# Patient Record
Sex: MALE | Race: WHITE | NOT HISPANIC OR LATINO | Employment: FULL TIME | URBAN - METROPOLITAN AREA
[De-identification: names, ages, dates, MRNs, and addresses within clinical notes are randomized per-mention and may not be internally consistent; named-entity substitution may affect disease eponyms.]

---

## 2017-04-30 ENCOUNTER — OFFICE VISIT (OUTPATIENT)
Dept: URGENT CARE | Facility: CLINIC | Age: 27
End: 2017-04-30

## 2017-04-30 VITALS
DIASTOLIC BLOOD PRESSURE: 84 MMHG | OXYGEN SATURATION: 96 % | TEMPERATURE: 98.6 F | SYSTOLIC BLOOD PRESSURE: 146 MMHG | HEIGHT: 74 IN | WEIGHT: 179 LBS | BODY MASS INDEX: 22.97 KG/M2 | HEART RATE: 104 BPM

## 2017-04-30 DIAGNOSIS — L03.213 PRESEPTAL CELLULITIS OF RIGHT LOWER EYELID: ICD-10-CM

## 2017-04-30 PROCEDURE — 99203 OFFICE O/P NEW LOW 30 MIN: CPT | Performed by: PHYSICIAN ASSISTANT

## 2017-04-30 RX ORDER — CLINDAMYCIN HYDROCHLORIDE 300 MG/1
300 CAPSULE ORAL 3 TIMES DAILY
Qty: 21 CAP | Refills: 0 | Status: SHIPPED | OUTPATIENT
Start: 2017-04-30 | End: 2017-05-07

## 2017-04-30 RX ORDER — POLYMYXIN B SULFATE AND TRIMETHOPRIM 1; 10000 MG/ML; [USP'U]/ML
1 SOLUTION OPHTHALMIC EVERY 4 HOURS
Qty: 10 ML | Refills: 0 | Status: SHIPPED | OUTPATIENT
Start: 2017-04-30 | End: 2017-05-05

## 2017-04-30 ASSESSMENT — ENCOUNTER SYMPTOMS
CHILLS: 0
HEADACHES: 0
MUSCULOSKELETAL NEGATIVE: 1
FEVER: 0
BLURRED VISION: 0
DIZZINESS: 0
EYE DISCHARGE: 0
PHOTOPHOBIA: 0
DIARRHEA: 0
FOREIGN BODY SENSATION: 0
SHORTNESS OF BREATH: 0
NAUSEA: 0
DOUBLE VISION: 0
ABDOMINAL PAIN: 0
VOMITING: 0
EYE REDNESS: 1
EYE PAIN: 0

## 2017-04-30 NOTE — MR AVS SNAPSHOT
"        Delbert Barney   2017 4:00 PM   Office Visit   MRN: [id number]    Department:  [facility]   Dept Phone:  [phone]    Description:  Male : 1990   Provider:  Spring Espinal PA-C           Reason for Visit     Eye Problem got some water while camping into eye and now red, swollen      Allergies as of 2017     Allergen Noted Reactions    Ibuprofen 2016       Shellfish Allergy 2016         You were diagnosed with     Preseptal cellulitis of right lower eyelid   [[id number]]         Vital Signs     Blood Pressure Pulse Temperature Height Weight Body Mass Index    146/84 mmHg 104 37 °C (98.6 °F) 1.88 m (6' 2\") 81.194 kg (179 lb) 22.97 kg/m2    Oxygen Saturation Smoking Status                96% Current Every Day Smoker          Basic Information     Date Of Birth Sex Race Ethnicity Preferred Language    1990 Male White Non- English      Health Maintenance     Patient has no pending health maintenance at this time      Current Immunizations     No immunizations on file.      Below and/or attached are the medications your provider expects you to take. Review all of your home medications and newly ordered medications with your provider and/or pharmacist. Follow medication instructions as directed by your provider and/or pharmacist. Please keep your medication list with you and share with your provider. Update the information when medications are discontinued, doses are changed, or new medications (including over-the-counter products) are added; and carry medication information at all times in the event of emergency situations     Allergies:  IBUPROFEN - (reactions not documented)     SHELLFISH ALLERGY - (reactions not documented)               Medications  Valid as of: 2017 -  5:31 PM    Generic Name Brand Name Tablet Size Instructions for use    Clindamycin HCl (Cap) CLEOCIN 300 MG Take 1 Cap by mouth 3 times a day for 7 days.        Docusate Sodium " (Cap) COLACE 100 MG Take 100 mg by mouth 2 times a day.        Oxycodone-Acetaminophen (Tab) PERCOCET 5-325 MG Take 1-2 Tabs by mouth every four hours as needed.        Polyethylene Glycol 3350 (Pack) MIRALAX  Take 17 g by mouth every day.        Polymyxin B-Trimethoprim (Solution) POLYTRIM 75629-2.1 UNIT/ML-% Place 1 Drop in both eyes every 4 hours.        .                 Medicines prescribed today were sent to:     [id number] - [place] - [address]    [address] [place] [address]    Phone: [phone] Fax: [phone]    Open 24 Hours?: No      Medication refill instructions:       If your prescription bottle indicates you have medication refills left, it is not necessary to call your provider’s office. Please contact your pharmacy and they will refill your medication.    If your prescription bottle indicates you do not have any refills left, you may request refills at any time through one of the following ways: The online Bluwan system (except Urgent Care), by calling your provider’s office, or by asking your pharmacy to contact your provider’s office with a refill request. Medication refills are processed only during regular business hours and may not be available until the next business day. Your provider may request additional information or to have a follow-up visit with you prior to refilling your medication.   *Please Note: Medication refills are assigned a new Rx number when refilled electronically. Your pharmacy may indicate that no refills were authorized even though a new prescription for the same medication is available at the pharmacy. Please request the medicine by name with the pharmacy before contacting your provider for a refill.           Bluwan Access Code: [email or web address]  Expires: 5/30/2017  5:31 PM    Bluwan  A secure, online tool to manage your health information     [employer]’s Bluwan® is a secure, online tool that connects you to your personalized  health information from the privacy of your home -- day or night - making it very easy for you to manage your healthcare. Once the activation process is completed, you can even access your medical information using the 8thBridge elsa, which is available for free in the Apple Elsa store or Google Play store.     8thBridge provides the following levels of access (as shown below):   My Chart Features   [facility] Doctor [facility]  Specialists [facility]  Urgent  Care Non-[facility]  Primary Care  Doctor   Email your healthcare team securely and privately 24/7 X X X    Manage appointments: schedule your next appointment; view details of past/upcoming appointments X      Request prescription refills. X      View recent personal medical records, including lab and immunizations X X X X   View health record, including health history, allergies, medications X X X X   Read reports about your outpatient visits, procedures, consult and ER notes X X X X   See your discharge summary, which is a recap of your hospital and/or ER visit that includes your diagnosis, lab results, and care plan. X X       How to register for 8thBridge:  1. Go to  [email or web address].  2. Click on the Sign Up Now box, which takes you to the New Member Sign Up page. You will need to provide the following information:  a. Enter your 8thBridge Access Code exactly as it appears at the top of this page. (You will not need to use this code after you’ve completed the sign-up process. If you do not sign up before the expiration date, you must request a new code.)   b. Enter your date of birth.   c. Enter your home email address.   d. Click Submit, and follow the next screen’s instructions.  3. Create a 8thBridge ID. This will be your 8thBridge login ID and cannot be changed, so think of one that is secure and easy to remember.  4. Create a 8thBridge password. You can change your password at any time.  5. Enter your Password Reset Question and Answer. This can be used at a  later time if you forget your password.   6. Enter your e-mail address. This allows you to receive e-mail notifications when new information is available in Brandkids.  7. Click Sign Up. You can now view your health information.    For assistance activating your Brandkids account, call [phone]        Quit Tobacco Information     Do you want to quit using tobacco?    Quitting tobacco decreases risks of cancer, heart and lung disease, increases life expectancy, improves sense of taste and smell, and increases spending money, among other benefits.    If you are thinking about quitting, we can help.  • [facility] Quit Tobacco Program: 105.391.2630  o Program occurs weekly for four weeks and includes pharmacist consultation on products to support quitting smoking or chewing tobacco. A provider referral is needed for pharmacist consultation.  • Tobacco Users Help Hotline: 4-949-QUIT-NOW (408-2004) or https://[place].quitlogix.org/  o Free, confidential telephone and online coaching for [place] residents. Sessions are designed on a schedule that is convenient for you. Eligible clients receive free nicotine replacement therapy.  • Nationally: www.smokefree.gov  o Information and professional assistance to support both immediate and long-term needs as you become, and remain, a non-smoker. Smokefree.gov allows you to choose the help that best fits your needs.

## 2017-05-01 NOTE — PROGRESS NOTES
"Subjective:      Delbert Braney is a 27 y.o. male who presents with Eye Problem            Eye Problem   The right eye is affected. This is a new problem. The current episode started in the past 7 days (2-3 days). The problem occurs constantly. The problem has been gradually worsening. Injury mechanism: diry water. The pain is at a severity of 0/10. The patient is experiencing no pain. There is no known exposure to pink eye. He does not wear contacts. Associated symptoms include eye redness. Pertinent negatives include no blurred vision, eye discharge, double vision, fever, foreign body sensation, nausea, photophobia or vomiting. He has tried eye drops for the symptoms. The treatment provided no relief.     Patient reports right eye redness with slight clear discharge for the past 2-3 days. He states he was camping when some \"dirty pond water\" got splashed in his right eye. He reports worsening right eye redness since then. Denies any eye pain, change in vision, photophobia, or foreign body sensation.     Review of Systems   Constitutional: Negative for fever and chills.   Eyes: Positive for redness. Negative for blurred vision, double vision, photophobia, pain and discharge.   Respiratory: Negative for shortness of breath.    Cardiovascular: Negative for chest pain.   Gastrointestinal: Negative for nausea, vomiting, abdominal pain and diarrhea.   Genitourinary: Negative.    Musculoskeletal: Negative.    Neurological: Negative for dizziness and headaches.          Objective:     /84 mmHg  Pulse 104  Temp(Src) 37 °C (98.6 °F)  Ht 1.88 m (6' 2\")  Wt 81.194 kg (179 lb)  BMI 22.97 kg/m2  SpO2 96%     Physical Exam   Constitutional: He is oriented to person, place, and time. He appears well-developed and well-nourished. No distress.   HENT:   Head: Normocephalic and atraumatic.   Eyes: EOM are normal. Pupils are equal, round, and reactive to light. Right conjunctiva is injected. Right conjunctiva has no " hemorrhage. Left conjunctiva is not injected. Left conjunctiva has no hemorrhage.   No pain with EOM. Conjunctiva erythematous without discharge noted. Mild erythema and edema noted over right lower lid and right upper lid.    Neck: Normal range of motion.   Cardiovascular: Normal rate.    Pulmonary/Chest: Effort normal.   Musculoskeletal: Normal range of motion.   Neurological: He is alert and oriented to person, place, and time.   Skin: He is not diaphoretic.   Psychiatric: He has a normal mood and affect. His behavior is normal.   Nursing note and vitals reviewed.         PMH:  has a past medical history of Asthma.  MEDS:   Current outpatient prescriptions:   •  clindamycin (CLEOCIN) 300 MG Cap, Take 1 Cap by mouth 3 times a day for 7 days., Disp: 21 Cap, Rfl: 0  •  polymixin-trimethoprim (POLYTRIM) 46554-1.1 UNIT/ML-% Solution, Place 1 Drop in both eyes every 4 hours., Disp: 10 mL, Rfl: 0  •  polyethylene glycol/lytes (MIRALAX) Pack, Take 17 g by mouth every day., Disp: , Rfl:   •  docusate sodium (COLACE) 100 MG Cap, Take 100 mg by mouth 2 times a day., Disp: , Rfl:   •  oxycodone-acetaminophen (PERCOCET) 5-325 MG Tab, Take 1-2 Tabs by mouth every four hours as needed., Disp: , Rfl:   ALLERGIES:   Allergies   Allergen Reactions   • Ibuprofen    • Shellfish Allergy      SURGHX:   Past Surgical History   Procedure Laterality Date   • Other abdominal surgery       hemorhoids     SOCHX:  reports that he has been smoking.  He does not have any smokeless tobacco history on file. He reports that he drinks alcohol. He reports that he does not use illicit drugs.  FH: family history is not on file.       Assessment/Plan:     1. Preseptal cellulitis of right lower eyelid  - clindamycin (CLEOCIN) 300 MG Cap; Take 1 Cap by mouth 3 times a day for 7 days.  Dispense: 21 Cap; Refill: 0   - Complete full course of antibiotics as prescribed   - polymixin-trimethoprim (POLYTRIM) 94496-7.1 UNIT/ML-% Solution; Place 1 Drop in both  eyes every 4 hours.  Dispense: 10 mL; Refill: 0    STRICT instructions given to go to the ED for any worsening symptoms after initiation of antibiotic therapy or development of fever or eye pain. The patient demonstrated a good understanding and agreed with the treatment plan..

## 2017-05-02 ENCOUNTER — TELEPHONE (OUTPATIENT)
Dept: URGENT CARE | Facility: CLINIC | Age: 27
End: 2017-05-02

## 2017-05-02 ENCOUNTER — TELEPHONE (OUTPATIENT)
Dept: URGENT CARE | Facility: PHYSICIAN GROUP | Age: 27
End: 2017-05-02

## 2017-05-02 NOTE — TELEPHONE ENCOUNTER
Called patient back regarding his eye symptoms. He states the welling around his eye has gone down since starting Clindamycin over the past 2 days, but his conjunctiva has become more red. His mother told him to go to the ER but he doesn't have good insurance and wanted to make sure he actually needed to go. He denies any change in vision, eye pain, or pain with EOMs. Denies fevers or chills. At this time I do not believe he needs to be seen in the ER. Advised to continue antibiotic eye drops and oral clindamycin as prescribed until completed. ER precautions given should he develop any increased swelling around the eye, eye pain with EOMs, or fevers. The patient demonstrated a good understanding and agreed with the treatment plan.

## 2017-05-02 NOTE — TELEPHONE ENCOUNTER
Called today to speak with you about whether or not he needs to be seen at ER. The swelling is going down but the eye is getting better. [phone]  Cell number

## 2017-05-04 ENCOUNTER — TELEPHONE (OUTPATIENT)
Dept: URGENT CARE | Facility: CLINIC | Age: 27
End: 2017-05-04

## 2017-05-05 ENCOUNTER — TELEPHONE (OUTPATIENT)
Dept: URGENT CARE | Facility: CLINIC | Age: 27
End: 2017-05-05

## 2017-05-05 DIAGNOSIS — H10.31 ACUTE CONJUNCTIVITIS OF RIGHT EYE, UNSPECIFIED ACUTE CONJUNCTIVITIS TYPE: ICD-10-CM

## 2017-05-05 RX ORDER — POLYMYXIN B SULFATE AND TRIMETHOPRIM 1; 10000 MG/ML; [USP'U]/ML
1 SOLUTION OPHTHALMIC EVERY 4 HOURS
Qty: 10 ML | Refills: 0 | Status: SHIPPED | OUTPATIENT
Start: 2017-05-05 | End: 2019-08-12

## 2017-05-05 NOTE — TELEPHONE ENCOUNTER
Patient called stating his dog bit chewed on his eye drops and is requesting a refill. He states his eye symptoms are still improving but not completely resolved. Advised to continue oral antibiotic until finished and and use eye drops for 2 days after symptoms resolve. Refill of polytrim eye drops sent to patient's pharmacy.

## 2019-08-12 ENCOUNTER — APPOINTMENT (OUTPATIENT)
Dept: RADIOLOGY | Facility: MEDICAL CENTER | Age: 29
End: 2019-08-12
Attending: EMERGENCY MEDICINE
Payer: COMMERCIAL

## 2019-08-12 ENCOUNTER — HOSPITAL ENCOUNTER (EMERGENCY)
Facility: MEDICAL CENTER | Age: 29
End: 2019-08-12
Attending: EMERGENCY MEDICINE
Payer: COMMERCIAL

## 2019-08-12 VITALS
WEIGHT: 182.32 LBS | RESPIRATION RATE: 11 BRPM | BODY MASS INDEX: 23.4 KG/M2 | HEIGHT: 74 IN | OXYGEN SATURATION: 99 % | DIASTOLIC BLOOD PRESSURE: 62 MMHG | SYSTOLIC BLOOD PRESSURE: 127 MMHG | HEART RATE: 44 BPM

## 2019-08-12 DIAGNOSIS — R00.2 PALPITATIONS: ICD-10-CM

## 2019-08-12 DIAGNOSIS — R00.2 PALPITATIONS: Primary | ICD-10-CM

## 2019-08-12 DIAGNOSIS — R00.2 PALPITATION: Primary | ICD-10-CM

## 2019-08-12 LAB
ALBUMIN SERPL BCP-MCNC: 4.5 G/DL (ref 3.2–4.9)
ALBUMIN/GLOB SERPL: 1.9 G/DL
ALP SERPL-CCNC: 50 U/L (ref 30–99)
ALT SERPL-CCNC: 19 U/L (ref 2–50)
ANION GAP SERPL CALC-SCNC: 8 MMOL/L (ref 0–11.9)
AST SERPL-CCNC: 20 U/L (ref 12–45)
BASOPHILS # BLD AUTO: 1.1 % (ref 0–1.8)
BASOPHILS # BLD: 0.06 K/UL (ref 0–0.12)
BILIRUB SERPL-MCNC: 1.3 MG/DL (ref 0.1–1.5)
BUN SERPL-MCNC: 13 MG/DL (ref 8–22)
CALCIUM SERPL-MCNC: 9.4 MG/DL (ref 8.4–10.2)
CHLORIDE SERPL-SCNC: 103 MMOL/L (ref 96–112)
CO2 SERPL-SCNC: 26 MMOL/L (ref 20–33)
CREAT SERPL-MCNC: 1.06 MG/DL (ref 0.5–1.4)
D DIMER PPP IA.FEU-MCNC: <0.4 UG/ML (FEU) (ref 0–0.5)
EKG IMPRESSION: NORMAL
EOSINOPHIL # BLD AUTO: 0.19 K/UL (ref 0–0.51)
EOSINOPHIL NFR BLD: 3.4 % (ref 0–6.9)
ERYTHROCYTE [DISTWIDTH] IN BLOOD BY AUTOMATED COUNT: 38.1 FL (ref 35.9–50)
GLOBULIN SER CALC-MCNC: 2.4 G/DL (ref 1.9–3.5)
GLUCOSE SERPL-MCNC: 96 MG/DL (ref 65–99)
HCT VFR BLD AUTO: 46.2 % (ref 42–52)
HGB BLD-MCNC: 16 G/DL (ref 14–18)
IMM GRANULOCYTES # BLD AUTO: 0.01 K/UL (ref 0–0.11)
IMM GRANULOCYTES NFR BLD AUTO: 0.2 % (ref 0–0.9)
LYMPHOCYTES # BLD AUTO: 1.18 K/UL (ref 1–4.8)
LYMPHOCYTES NFR BLD: 21 % (ref 22–41)
MCH RBC QN AUTO: 30.3 PG (ref 27–33)
MCHC RBC AUTO-ENTMCNC: 34.6 G/DL (ref 33.7–35.3)
MCV RBC AUTO: 87.5 FL (ref 81.4–97.8)
MONOCYTES # BLD AUTO: 0.5 K/UL (ref 0–0.85)
MONOCYTES NFR BLD AUTO: 8.9 % (ref 0–13.4)
NEUTROPHILS # BLD AUTO: 3.67 K/UL (ref 1.82–7.42)
NEUTROPHILS NFR BLD: 65.4 % (ref 44–72)
NRBC # BLD AUTO: 0 K/UL
NRBC BLD-RTO: 0 /100 WBC
PLATELET # BLD AUTO: 210 K/UL (ref 164–446)
PMV BLD AUTO: 9.7 FL (ref 9–12.9)
POTASSIUM SERPL-SCNC: 3.8 MMOL/L (ref 3.6–5.5)
PROT SERPL-MCNC: 6.9 G/DL (ref 6–8.2)
RBC # BLD AUTO: 5.28 M/UL (ref 4.7–6.1)
SODIUM SERPL-SCNC: 137 MMOL/L (ref 135–145)
T4 FREE SERPL-MCNC: 0.87 NG/DL (ref 0.58–1.64)
TROPONIN T SERPL-MCNC: <6 NG/L (ref 6–19)
TSH SERPL DL<=0.005 MIU/L-ACNC: 0.53 UIU/ML (ref 0.38–5.33)
WBC # BLD AUTO: 5.6 K/UL (ref 4.8–10.8)

## 2019-08-12 PROCEDURE — 84484 ASSAY OF TROPONIN QUANT: CPT

## 2019-08-12 PROCEDURE — 36415 COLL VENOUS BLD VENIPUNCTURE: CPT

## 2019-08-12 PROCEDURE — 85379 FIBRIN DEGRADATION QUANT: CPT

## 2019-08-12 PROCEDURE — 84439 ASSAY OF FREE THYROXINE: CPT

## 2019-08-12 PROCEDURE — 84443 ASSAY THYROID STIM HORMONE: CPT

## 2019-08-12 PROCEDURE — 85025 COMPLETE CBC W/AUTO DIFF WBC: CPT

## 2019-08-12 PROCEDURE — 71045 X-RAY EXAM CHEST 1 VIEW: CPT

## 2019-08-12 PROCEDURE — 93005 ELECTROCARDIOGRAM TRACING: CPT | Performed by: EMERGENCY MEDICINE

## 2019-08-12 PROCEDURE — 80053 COMPREHEN METABOLIC PANEL: CPT

## 2019-08-12 PROCEDURE — 93005 ELECTROCARDIOGRAM TRACING: CPT

## 2019-08-12 PROCEDURE — 99284 EMERGENCY DEPT VISIT MOD MDM: CPT

## 2019-08-12 NOTE — ED PROVIDER NOTES
ED Provider Note    CHIEF COMPLAINT  Chief Complaint   Patient presents with   • Palpitations       HPI  Delbert Barney is a 29 y.o. male who presents planing of intermittent palpitations for the last few weeks.  The patient states that when he felt his pulse in his neck it felt rapid and irregular.  His symptoms lasted for approximately an hour with some associated shortness of breath and then resolved on its own.  The patient has a history of asthma but has not had any coughing wheezing or worsening shortness of breath lately.  He has not had any fevers or chills.  He denies any leg swelling or diaphoresis.  When he has the episodes of palpitations he states he does feel short of breath.  Patient does smoke but quit smoking 4 days ago thinking that that would help his symptoms.  He had another episode of palpitations this morning however so he came in for evaluation.  He does not take any medications.  He does smoke marijuana and stop smoking cigarettes 2 years ago but otherwise has a 9-year history of smoking.  He does not take any medications on a daily basis.  He denies any other drug or stimulant use other than a couple coffee in the morning.  Currently he does not have any palpitations and is chest pain-free.    REVIEW OF SYSTEMS  HEENT:  No ear pain, congestion or sore throat   EYES: no discharge redness or vision changes  CARDIAC: no chest pain, positive palpitations palpitations    PULMONARY: no dyspnea, cough or congestion   GI: no vomiting diarrhea or abdominal pain   : no dysuria, back pain or hematuria   Neuro: no weakness, numbness aphasia or headache  Musculoskeletal: no swelling deformity or pain no joint swelling  Endocrine: no fevers, sweating, weight loss   SKIN: no rash, erythema or contusions     See history of present illness all other systems are negative  .     PAST MEDICAL HISTORY  Past Medical History:   Diagnosis Date   • Asthma        FAMILY HISTORY  No family history on  "file.    SOCIAL HISTORY  Social History     Socioeconomic History   • Marital status: Single     Spouse name: Not on file   • Number of children: Not on file   • Years of education: Not on file   • Highest education level: Not on file   Occupational History   • Not on file   Social Needs   • Financial resource strain: Not on file   • Food insecurity:     Worry: Not on file     Inability: Not on file   • Transportation needs:     Medical: Not on file     Non-medical: Not on file   Tobacco Use   • Smoking status: Current Every Day Smoker     Packs/day: 0.25   Substance and Sexual Activity   • Alcohol use: Yes     Comment: social   • Drug use: No   • Sexual activity: Not on file   Lifestyle   • Physical activity:     Days per week: Not on file     Minutes per session: Not on file   • Stress: Not on file   Relationships   • Social connections:     Talks on phone: Not on file     Gets together: Not on file     Attends Quaker service: Not on file     Active member of club or organization: Not on file     Attends meetings of clubs or organizations: Not on file     Relationship status: Not on file   • Intimate partner violence:     Fear of current or ex partner: Not on file     Emotionally abused: Not on file     Physically abused: Not on file     Forced sexual activity: Not on file   Other Topics Concern   • Not on file   Social History Narrative   • Not on file       SURGICAL HISTORY  Past Surgical History:   Procedure Laterality Date   • OTHER ABDOMINAL SURGERY      hemorhoids       CURRENT MEDICATIONS  Home Medications     Reviewed by Humberto Bourgeois (Pharmacy Tech) on 08/12/19 at 0735  Med List Status: Complete   Medication Last Dose Status        Patient Dalton Taking any Medications                       ALLERGIES  Allergies   Allergen Reactions   • Ibuprofen    • Shellfish Allergy        PHYSICAL EXAM  VITAL SIGNS: /94   Pulse 60   Resp 14   Ht 1.88 m (6' 2\")   Wt 82.7 kg (182 lb 5.1 oz)   SpO2 94%  "  BMI 23.41 kg/m²       Constitutional: Well developed, Well nourished, No acute distress, Non-toxic appearance.   HENT: Normocephalic, Atraumatic, Bilateral external ears normal, Oropharynx moist, No oral exudates, Nose normal.   Eyes: PERRLA, EOMI, Conjunctiva normal, No discharge.   Neck: Normal range of motion, No tenderness, Supple, No stridor.   Cardiovascular: Regular rate and rhythm no murmurs rubs or gallops  Thorax & Lungs: Clear to auscultation bilaterally without wheezes rales or rhonchi no chest wall tenderness  Abdomen: Bowel sounds normal, Soft, No tenderness, No masses, No pulsatile masses.   Skin: Warm, Dry, No erythema, No rash.   Back: No tenderness, No CVA tenderness.   Extremities: Intact distal pulses, No tenderness, No cyanosis, No clubbing.  Negative edema negative cording negative Homans sign  Neurologic: Alert & oriented x 3, Normal motor function, Normal sensory function, No focal deficits noted.         RADIOLOGY/PROCEDURES  DX-CHEST-PORTABLE (1 VIEW)   Final Result      No evidence of acute cardiopulmonary process.            COURSE & MEDICAL DECISION MAKING  Pertinent Labs & Imaging studies reviewed. (See chart for details)  Differential diagnosis: Atrial fibrillation, SVT, hyperthyroid, PE, anxiety, reaction to stimulant medication or caffeine    Results for orders placed or performed during the hospital encounter of 08/12/19   CBC with Differential   Result Value Ref Range    WBC 5.6 4.8 - 10.8 K/uL    RBC 5.28 4.70 - 6.10 M/uL    Hemoglobin 16.0 14.0 - 18.0 g/dL    Hematocrit 46.2 42.0 - 52.0 %    MCV 87.5 81.4 - 97.8 fL    MCH 30.3 27.0 - 33.0 pg    MCHC 34.6 33.7 - 35.3 g/dL    RDW 38.1 35.9 - 50.0 fL    Platelet Count 210 164 - 446 K/uL    MPV 9.7 9.0 - 12.9 fL    Neutrophils-Polys 65.40 44.00 - 72.00 %    Lymphocytes 21.00 (L) 22.00 - 41.00 %    Monocytes 8.90 0.00 - 13.40 %    Eosinophils 3.40 0.00 - 6.90 %    Basophils 1.10 0.00 - 1.80 %    Immature Granulocytes 0.20 0.00 -  0.90 %    Nucleated RBC 0.00 /100 WBC    Neutrophils (Absolute) 3.67 1.82 - 7.42 K/uL    Lymphs (Absolute) 1.18 1.00 - 4.80 K/uL    Monos (Absolute) 0.50 0.00 - 0.85 K/uL    Eos (Absolute) 0.19 0.00 - 0.51 K/uL    Baso (Absolute) 0.06 0.00 - 0.12 K/uL    Immature Granulocytes (abs) 0.01 0.00 - 0.11 K/uL    NRBC (Absolute) 0.00 K/uL   Complete Metabolic Panel (CMP)   Result Value Ref Range    Sodium 137 135 - 145 mmol/L    Potassium 3.8 3.6 - 5.5 mmol/L    Chloride 103 96 - 112 mmol/L    Co2 26 20 - 33 mmol/L    Anion Gap 8.0 0.0 - 11.9    Glucose 96 65 - 99 mg/dL    Bun 13 8 - 22 mg/dL    Creatinine 1.06 0.50 - 1.40 mg/dL    Calcium 9.4 8.4 - 10.2 mg/dL    AST(SGOT) 20 12 - 45 U/L    ALT(SGPT) 19 2 - 50 U/L    Alkaline Phosphatase 50 30 - 99 U/L    Total Bilirubin 1.3 0.1 - 1.5 mg/dL    Albumin 4.5 3.2 - 4.9 g/dL    Total Protein 6.9 6.0 - 8.2 g/dL    Globulin 2.4 1.9 - 3.5 g/dL    A-G Ratio 1.9 g/dL   Troponin STAT   Result Value Ref Range    Troponin T <6 6 - 19 ng/L   D-Dimer   Result Value Ref Range    D-Dimer Screen <0.40 0.00 - 0.50 ug/mL (FEU)   TSH (for screening thyroid dysfunction)   Result Value Ref Range    TSH 0.530 0.380 - 5.330 uIU/mL   Free Thyroxine (add to TSH for patients with existing thyroid dysfunction)   Result Value Ref Range    Free T-4 0.87 0.58 - 1.64 ng/dL   ESTIMATED GFR   Result Value Ref Range    GFR If African American >60 >60 mL/min/1.73 m 2    GFR If Non African American >60 >60 mL/min/1.73 m 2   EKG   Result Value Ref Range    Report       [facility] Emergency Dept.    Test Date:  2019  Pt Name:    ZOEY SORIA               Department: [facility]  MRN:        [id number]                      [facility]  Gender:     Male                         Technician: [id number]  :        1990                   Requested By:ER TRIAGE PROTOCOL  Order #:    [id number]                    Reading MD: JEMMA GASTELUM MD    Measurements  Intervals                                 Axis  Rate:       64                           P:          59  VT:         162                          QRS:        77  QRSD:       111                          T:          39  QT:         420  QTc:        434    Interpretive Statements  Sinus rhythm  RSR' in V1 or V2, right VCD or RVH  No previous ECG available for comparison    Electronically Signed On 8- 7:12:12 PDT by JEMMA GASTELUM MD           The patient will return for new or worsening symptoms and is stable at the time of discharge.    The patient is referred to a primary physician for blood pressure management, diabetic screening, and for all other preventative health concerns.  He will also be referred to cardiology for Holter monitor testing.    Patient's heart score is 0      9:04 AM the patient's lab work is unremarkable and he has not had any palpitations or chest pain or arrhythmias in the emergency department.  I spoke with the ED scheduling who will schedule him for an outpatient Holter monitor and primary care follow-up.  I advised him to avoid caffeine and continue to not smoke.  If he has worsening palpitations chest pains or shortness of breath he should return to the emergency department for further work-up and care.  Patient understands his diagnosis and the plan of care and will be discharged home in stable condition.  DISPOSITION:  Patient will be discharged home in stable condition.    FOLLOW UP:  With primary care physician as scheduled by ED scheduling and cardiology clinic for Holter monitoring      OUTPATIENT MEDICATIONS:  New Prescriptions    No medications on file         FINAL IMPRESSION  1. Palpitations             Electronically signed by: Jemma Gastelum, 8/12/2019 7:00 AM

## 2019-08-12 NOTE — ED NOTES
Reviewed discharge instructions w/ pt, verbalized understanding to information provided including follow up care w/ PCP for Holter Monitoring in September, denied questions/concerns.  Pt denied c/o palpitations, CP or shortness of breath.  Pt ambulated from ED.

## 2019-08-12 NOTE — ED TRIAGE NOTES
"Pt. States that last night he could feel his heart beating rapidly and that he could also feel it coming on again today. Pt. jesi any chest pain or SOB at this time. pt. Is awake alert, pt. Admits to smoking \"weed\" pta to help calm him down.  "

## 2019-08-12 NOTE — ED NOTES
0746:  Reviewed POC w/ pt including pending tests.  Blood drawn and sent to lab  0752:  Radiology at bedside.

## 2019-09-19 ENCOUNTER — OFFICE VISIT (OUTPATIENT)
Dept: MEDICAL GROUP | Age: 29
End: 2019-09-19
Payer: COMMERCIAL

## 2019-09-19 VITALS
DIASTOLIC BLOOD PRESSURE: 70 MMHG | HEIGHT: 74 IN | WEIGHT: 184 LBS | OXYGEN SATURATION: 93 % | SYSTOLIC BLOOD PRESSURE: 120 MMHG | BODY MASS INDEX: 23.61 KG/M2 | TEMPERATURE: 98.3 F | HEART RATE: 87 BPM

## 2019-09-19 DIAGNOSIS — R00.2 PALPITATIONS: ICD-10-CM

## 2019-09-19 DIAGNOSIS — Z76.89 ESTABLISHING CARE WITH NEW DOCTOR, ENCOUNTER FOR: ICD-10-CM

## 2019-09-19 DIAGNOSIS — Z23 NEED FOR VACCINATION: ICD-10-CM

## 2019-09-19 DIAGNOSIS — K21.9 GASTROESOPHAGEAL REFLUX DISEASE, ESOPHAGITIS PRESENCE NOT SPECIFIED: ICD-10-CM

## 2019-09-19 DIAGNOSIS — Z91.013 ALLERGY TO SHELLFISH: ICD-10-CM

## 2019-09-19 DIAGNOSIS — J45.20 MILD INTERMITTENT ASTHMA WITHOUT COMPLICATION: ICD-10-CM

## 2019-09-19 PROCEDURE — 99214 OFFICE O/P EST MOD 30 MIN: CPT | Mod: 25 | Performed by: PHYSICIAN ASSISTANT

## 2019-09-19 PROCEDURE — 90732 PPSV23 VACC 2 YRS+ SUBQ/IM: CPT | Performed by: PHYSICIAN ASSISTANT

## 2019-09-19 PROCEDURE — 90471 IMMUNIZATION ADMIN: CPT | Performed by: PHYSICIAN ASSISTANT

## 2019-09-19 RX ORDER — OMEPRAZOLE 20 MG/1
20 CAPSULE, DELAYED RELEASE ORAL DAILY
Qty: 30 CAP | Refills: 1 | Status: SHIPPED | OUTPATIENT
Start: 2019-09-19 | End: 2019-10-19

## 2019-09-19 RX ORDER — ALBUTEROL SULFATE 90 UG/1
2 AEROSOL, METERED RESPIRATORY (INHALATION) EVERY 6 HOURS PRN
Qty: 8.5 G | Refills: 3 | Status: SHIPPED | OUTPATIENT
Start: 2019-09-19

## 2019-09-19 SDOH — HEALTH STABILITY: MENTAL HEALTH: HOW OFTEN DO YOU HAVE A DRINK CONTAINING ALCOHOL?: MONTHLY OR LESS

## 2019-09-19 SDOH — HEALTH STABILITY: MENTAL HEALTH: HOW MANY STANDARD DRINKS CONTAINING ALCOHOL DO YOU HAVE ON A TYPICAL DAY?: 1 OR 2

## 2019-09-19 ASSESSMENT — PATIENT HEALTH QUESTIONNAIRE - PHQ9: CLINICAL INTERPRETATION OF PHQ2 SCORE: 0

## 2019-09-19 NOTE — PROGRESS NOTES
"cc: Establish care, asthma, hospital follow-up, nausea    Subjective:     HPI  Delbert Barney is a 29 y.o. male presenting to establish care.  He has not been seen by a primary care provider in over 10 years.  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  His EpiPen's are  and would like a new prescription for these.    Palpitations  He was in the ED 2019 with complaints of palpitations.  EKG, troponins, d-dimer, CMP, CBC and TSH are all within normal limits.  He was asymptomatic in the ED.  He was advised to follow-up with Holter monitor, however he has not done this.  Since his visit to the ED has only had representation of symptoms once.  He had not been smoking, and then started smoking again, this is when he started to have the palpitations when he presented to the ED.  He again 2 weeks later smoked cigarettes, and the same symptoms presented.  He believes it is associated with the nicotine.  Denies dizziness, chest pain, shortness of breath, lower extremity edema, shortness of breath, diaphoresis.     Mild intermittent asthma without complication  He had fairly moderate asthma as a child.  He has outgrown this.  He very rarely will become wheezy.  Has not had an albuterol inhaler for a few years now.  Is requesting to have one on hand.  Denies cough, chest congestion, shortness of breath.    Gastroesophageal reflux disease  For the past 4 months he has been waking up in the morning with a \"sensitive stomach\".  He states that it feels more queasy.  Occasional he will wake up and vomit.  This happens maybe once a week.  He has recently moved in with his girlfriend, who has very poor eating habits.  He does also state that he knows when he eats a lot at night-after he smokes marijuana-he will wake up feeling nauseated and queasy.  This does not happen every time.  He does however wake up daily feeling nauseated and having an unsettled stomach.  It does " "resolve throughout the day.  He does not note any associated abdominal pain.  Denies constipation, diarrhea, melena, hematochezia, hematemesis.      Review of systems:  See above.       Current Outpatient Medications:   •  omeprazole (PRILOSEC) 20 MG delayed-release capsule, Take 1 Cap by mouth every day for 30 days., Disp: 30 Cap, Rfl: 1  •  EPINEPHrine 0.3 MG/0.3ML Solution Prefilled Syringe, 0.3 mg by Injection route as needed., Disp: 1 Each, Rfl: 4  •  albuterol 108 (90 Base) MCG/ACT Aero Soln inhalation aerosol, Inhale 2 Puffs by mouth every 6 hours as needed for Shortness of Breath., Disp: 8.5 g, Rfl: 3    Allergies, past medical history, past surgical history, family history, social history reviewed and updated    Objective:     Vitals: /70 (BP Location: Left arm, Patient Position: Sitting, BP Cuff Size: Adult)   Pulse 87   Temp 36.8 °C (98.3 °F) (Temporal)   Ht 1.88 m (6' 2\")   Wt 83.5 kg (184 lb)   SpO2 93%   BMI 23.62 kg/m²   General: Alert, pleasant, NAD  HEENT: Normocephalic. Neck supple.  No thyromegaly or masses palpated. No cervical or supraclavicular lymphadenopathy. No carotid bruits   Heart: Regular rate and rhythm.  S1 and S2 normal.  No murmurs appreciated.  Respiratory: Normal respiratory effort.  Clear to auscultation bilaterally.  Abdomen:  Normoactive bowel sounds.  Non-distended, soft, non-tender, no guarding/rebound. No hepatosplenomegaly.  No hernias.  Negative Ackerman's sign. Negative McBurney's    Skin: Warm, dry, no rashes.  Extremities: No leg edema.  Radial pulses 2+ symmetric  Psych:  Affect/mood is normal, judgement is good, memory is intact, grooming is appropriate.    Assessment/Plan:       Diagnoses and all orders for this visit:    Palpitations  -Resolved.  Do believe that these are associated with nicotine.  He has not had any symptoms for over 4 weeks now.  If he has representation of palpitations then would advised to follow-up with Holter " monitor.    Gastroesophageal reflux disease, esophagitis presence not specified  -His eating habits have changed significantly with the presentation of symptoms.  Advised to modify eating habits.  Also start on trial of omeprazole daily.  Discussed if he has minimal to no improvement will consider referral to GI for further evaluation.  He is agreeable  -     omeprazole (PRILOSEC) 20 MG delayed-release capsule; Take 1 Cap by mouth every day for 30 days.    Allergy to shellfish  -He has anaphylactic reaction to shellfish.  Prescription for EpiPen sent in.  -     EPINEPHrine 0.3 MG/0.3ML Solution Prefilled Syringe; 0.3 mg by Injection route as needed.    Mild intermittent asthma without complication  -Stable.  Continue albuterol as needed  -     albuterol 108 (90 Base) MCG/ACT Aero Soln inhalation aerosol; Inhale 2 Puffs by mouth every 6 hours as needed for Shortness of Breath.    Establishing care with new doctor, encounter for  -We will request old records and review when received.    Need for vaccination  -     Pneumoccocal Polysaccharide Vaccine 23-Valent =>1yo SQ/IM        Return in about 6 weeks (around 10/31/2019) for gerd.

## 2020-08-27 ENCOUNTER — APPOINTMENT (RX ONLY)
Dept: URBAN - METROPOLITAN AREA CLINIC 4 | Facility: CLINIC | Age: 30
Setting detail: DERMATOLOGY
End: 2020-08-27

## 2020-08-27 DIAGNOSIS — D18.0 HEMANGIOMA: ICD-10-CM

## 2020-08-27 DIAGNOSIS — L81.4 OTHER MELANIN HYPERPIGMENTATION: ICD-10-CM

## 2020-08-27 DIAGNOSIS — L21.8 OTHER SEBORRHEIC DERMATITIS: ICD-10-CM

## 2020-08-27 DIAGNOSIS — D22 MELANOCYTIC NEVI: ICD-10-CM

## 2020-08-27 DIAGNOSIS — Z71.89 OTHER SPECIFIED COUNSELING: ICD-10-CM

## 2020-08-27 PROBLEM — D22.61 MELANOCYTIC NEVI OF RIGHT UPPER LIMB, INCLUDING SHOULDER: Status: ACTIVE | Noted: 2020-08-27

## 2020-08-27 PROBLEM — D22.62 MELANOCYTIC NEVI OF LEFT UPPER LIMB, INCLUDING SHOULDER: Status: ACTIVE | Noted: 2020-08-27

## 2020-08-27 PROBLEM — D22.5 MELANOCYTIC NEVI OF TRUNK: Status: ACTIVE | Noted: 2020-08-27

## 2020-08-27 PROBLEM — D18.01 HEMANGIOMA OF SKIN AND SUBCUTANEOUS TISSUE: Status: ACTIVE | Noted: 2020-08-27

## 2020-08-27 PROCEDURE — 99203 OFFICE O/P NEW LOW 30 MIN: CPT

## 2020-08-27 PROCEDURE — ? PRESCRIPTION

## 2020-08-27 PROCEDURE — ? COUNSELING

## 2020-08-27 RX ORDER — KETOCONAZOLE 20 MG/G
CREAM TOPICAL BID
Qty: 1 | Refills: 12 | Status: CANCELLED

## 2020-08-27 RX ORDER — CLOBETASOL PROPIONATE 0.5 MG/ML
SOLUTION TOPICAL
Qty: 1 | Refills: 3 | Status: ERX | COMMUNITY
Start: 2020-08-27

## 2020-08-27 RX ADMIN — CLOBETASOL PROPIONATE: 0.5 SOLUTION TOPICAL at 00:00

## 2020-08-27 ASSESSMENT — LOCATION DETAILED DESCRIPTION DERM
LOCATION DETAILED: LEFT LATERAL EYEBROW
LOCATION DETAILED: RIGHT MEDIAL INFERIOR CHEST
LOCATION DETAILED: LEFT VENTRAL PROXIMAL FOREARM
LOCATION DETAILED: RIGHT DISTAL POSTERIOR UPPER ARM
LOCATION DETAILED: LEFT MEDIAL MALAR CHEEK
LOCATION DETAILED: LEFT SUPERIOR PARIETAL SCALP
LOCATION DETAILED: RIGHT POSTERIOR SHOULDER
LOCATION DETAILED: LEFT PROXIMAL POSTERIOR UPPER ARM
LOCATION DETAILED: RIGHT SUPERIOR MEDIAL UPPER BACK
LOCATION DETAILED: LEFT POSTERIOR SHOULDER
LOCATION DETAILED: LEFT MEDIAL SUPERIOR CHEST
LOCATION DETAILED: LEFT PROXIMAL DORSAL FOREARM
LOCATION DETAILED: RIGHT PROXIMAL DORSAL FOREARM
LOCATION DETAILED: RIGHT VENTRAL PROXIMAL FOREARM
LOCATION DETAILED: RIGHT LATERAL SUPERIOR CHEST

## 2020-08-27 ASSESSMENT — LOCATION SIMPLE DESCRIPTION DERM
LOCATION SIMPLE: LEFT FOREARM
LOCATION SIMPLE: RIGHT UPPER BACK
LOCATION SIMPLE: LEFT UPPER ARM
LOCATION SIMPLE: RIGHT FOREARM
LOCATION SIMPLE: CHEST
LOCATION SIMPLE: LEFT SHOULDER
LOCATION SIMPLE: LEFT EYEBROW
LOCATION SIMPLE: RIGHT UPPER ARM
LOCATION SIMPLE: RIGHT SHOULDER
LOCATION SIMPLE: SCALP
LOCATION SIMPLE: LEFT CHEEK

## 2020-08-27 ASSESSMENT — LOCATION ZONE DERM
LOCATION ZONE: TRUNK
LOCATION ZONE: FACE
LOCATION ZONE: SCALP
LOCATION ZONE: ARM

## 2025-08-13 ENCOUNTER — APPOINTMENT (OUTPATIENT)
Dept: URBAN - METROPOLITAN AREA CLINIC 6 | Facility: CLINIC | Age: 35
Setting detail: DERMATOLOGY
End: 2025-08-13

## 2025-08-13 DIAGNOSIS — L21.8 OTHER SEBORRHEIC DERMATITIS: ICD-10-CM

## 2025-08-13 DIAGNOSIS — D18.0 HEMANGIOMA: ICD-10-CM

## 2025-08-13 DIAGNOSIS — L81.3 CAFÉ AU LAIT SPOTS: ICD-10-CM

## 2025-08-13 DIAGNOSIS — L82.1 OTHER SEBORRHEIC KERATOSIS: ICD-10-CM

## 2025-08-13 DIAGNOSIS — Z71.89 OTHER SPECIFIED COUNSELING: ICD-10-CM

## 2025-08-13 DIAGNOSIS — L81.4 OTHER MELANIN HYPERPIGMENTATION: ICD-10-CM

## 2025-08-13 DIAGNOSIS — D22 MELANOCYTIC NEVI: ICD-10-CM

## 2025-08-13 PROBLEM — D22.72 MELANOCYTIC NEVI OF LEFT LOWER LIMB, INCLUDING HIP: Status: ACTIVE | Noted: 2025-08-13

## 2025-08-13 PROBLEM — D22.39 MELANOCYTIC NEVI OF OTHER PARTS OF FACE: Status: ACTIVE | Noted: 2025-08-13

## 2025-08-13 PROBLEM — D22.5 MELANOCYTIC NEVI OF TRUNK: Status: ACTIVE | Noted: 2025-08-13

## 2025-08-13 PROBLEM — D18.01 HEMANGIOMA OF SKIN AND SUBCUTANEOUS TISSUE: Status: ACTIVE | Noted: 2025-08-13

## 2025-08-13 PROBLEM — D22.62 MELANOCYTIC NEVI OF LEFT UPPER LIMB, INCLUDING SHOULDER: Status: ACTIVE | Noted: 2025-08-13

## 2025-08-13 PROBLEM — D22.71 MELANOCYTIC NEVI OF RIGHT LOWER LIMB, INCLUDING HIP: Status: ACTIVE | Noted: 2025-08-13

## 2025-08-13 PROBLEM — D22.61 MELANOCYTIC NEVI OF RIGHT UPPER LIMB, INCLUDING SHOULDER: Status: ACTIVE | Noted: 2025-08-13

## 2025-08-13 PROCEDURE — ? COUNSELING

## 2025-08-13 PROCEDURE — ? ADDITIONAL NOTES

## 2025-08-13 PROCEDURE — ? SUNSCREEN TREATMENT REGIMEN

## 2025-08-13 ASSESSMENT — LOCATION DETAILED DESCRIPTION DERM
LOCATION DETAILED: EPIGASTRIC SKIN
LOCATION DETAILED: RIGHT MEDIAL FRONTAL SCALP
LOCATION DETAILED: LEFT ANTERIOR DISTAL UPPER ARM
LOCATION DETAILED: GLABELLA
LOCATION DETAILED: LOWER STERNUM
LOCATION DETAILED: RIGHT ANTERIOR DISTAL UPPER ARM
LOCATION DETAILED: MIDDLE STERNUM
LOCATION DETAILED: RIGHT ANTERIOR PROXIMAL THIGH
LOCATION DETAILED: LEFT SUPERIOR MEDIAL UPPER BACK
LOCATION DETAILED: LEFT MEDIAL UPPER BACK
LOCATION DETAILED: LEFT INFERIOR MEDIAL FOREHEAD
LOCATION DETAILED: LEFT DISTAL POSTERIOR UPPER ARM
LOCATION DETAILED: RIGHT DISTAL POSTERIOR UPPER ARM
LOCATION DETAILED: INFERIOR THORACIC SPINE
LOCATION DETAILED: LEFT NASAL ALA
LOCATION DETAILED: RIGHT MEDIAL FOREHEAD
LOCATION DETAILED: LEFT SUPERIOR MEDIAL MIDBACK
LOCATION DETAILED: SUPERIOR THORACIC SPINE
LOCATION DETAILED: LEFT ANTERIOR PROXIMAL THIGH

## 2025-08-13 ASSESSMENT — LOCATION SIMPLE DESCRIPTION DERM
LOCATION SIMPLE: RIGHT THIGH
LOCATION SIMPLE: RIGHT UPPER ARM
LOCATION SIMPLE: RIGHT SCALP
LOCATION SIMPLE: ABDOMEN
LOCATION SIMPLE: RIGHT FOREHEAD
LOCATION SIMPLE: LEFT POSTERIOR UPPER ARM
LOCATION SIMPLE: RIGHT POSTERIOR UPPER ARM
LOCATION SIMPLE: LEFT FOREHEAD
LOCATION SIMPLE: LEFT UPPER BACK
LOCATION SIMPLE: LEFT NOSE
LOCATION SIMPLE: LEFT UPPER ARM
LOCATION SIMPLE: GLABELLA
LOCATION SIMPLE: UPPER BACK
LOCATION SIMPLE: LEFT THIGH
LOCATION SIMPLE: LEFT LOWER BACK
LOCATION SIMPLE: CHEST

## 2025-08-13 ASSESSMENT — LOCATION ZONE DERM
LOCATION ZONE: NOSE
LOCATION ZONE: SCALP
LOCATION ZONE: LEG
LOCATION ZONE: FACE
LOCATION ZONE: TRUNK
LOCATION ZONE: ARM